# Patient Record
Sex: MALE | Race: WHITE | NOT HISPANIC OR LATINO | Employment: UNEMPLOYED | URBAN - METROPOLITAN AREA
[De-identification: names, ages, dates, MRNs, and addresses within clinical notes are randomized per-mention and may not be internally consistent; named-entity substitution may affect disease eponyms.]

---

## 2017-02-09 ENCOUNTER — PATIENT OUTREACH (OUTPATIENT)
Dept: ADMINISTRATIVE | Facility: HOSPITAL | Age: 62
End: 2017-02-09

## 2017-02-09 NOTE — PROGRESS NOTES
Due for bp check, last bp 146/88, last ov Katelin 5/2016, Schiro 8/2015    Also due your fasting cholesterol labs and shingles and flu immunizations    4th attempt

## 2017-05-04 ENCOUNTER — PATIENT OUTREACH (OUTPATIENT)
Dept: ADMINISTRATIVE | Facility: HOSPITAL | Age: 62
End: 2017-05-04

## 2017-05-04 NOTE — LETTER
May 16, 2017    Iván Chavez  [address] [address] [address]             [facility]  [address]  [facility] [address]  Phone: [phone] Dear Mr. Chavez:    We have tried to reach you by mychart unsuccessfully.    [facility] is committed to your overall health.  To help you get the most out of each of your visits, we will review your information to make sure you are up to date on all of your recommended tests and/or procedures.  We have Dr. Rosa Medina listed as your primary care provider.     If Dr. Medina is no longer your primary care provider, please contact me so that we may update our records accordingly.       She has found that you may be due for an office visit with her, your fasting cholesterol labs, and possibly a shingles immunization.     If you have had any of the above done at an outside facility, please let us know so I can update your record.  If you have a copy of these records, please provide a copy for us to scan into your chart.  If not, please provide that provider/facilities contact information so that we may obtain copies from that facility.     Otherwise, please schedule these appointments at your earliest convenience.     If you have any questions or concerns, please don't hesitate to call.    Thank you for letting us care for you,  Shima Oliva LPN Clinical Care Coordinator  [facility] [place]  [phone]

## 2017-05-04 NOTE — PROGRESS NOTES
HTN registry, over 1 year.  Called pt to inform:    Due for an office visit with her, your fasting cholesterol labs, and possibly a shingles immunization    Last of Katelin 5/2016, Adam 8/2015, 5th attempt

## 2017-06-02 RX ORDER — LISINOPRIL 10 MG/1
TABLET ORAL
Qty: 90 TABLET | Refills: 0 | Status: SHIPPED | OUTPATIENT
Start: 2017-06-02 | End: 2017-09-12 | Stop reason: SDUPTHER

## 2017-06-02 RX ORDER — CITALOPRAM 40 MG/1
TABLET, FILM COATED ORAL
Qty: 90 TABLET | Refills: 0 | Status: SHIPPED | OUTPATIENT
Start: 2017-06-02 | End: 2017-09-12 | Stop reason: SDUPTHER

## 2017-06-13 ENCOUNTER — PATIENT OUTREACH (OUTPATIENT)
Dept: ADMINISTRATIVE | Facility: HOSPITAL | Age: 62
End: 2017-06-13

## 2017-06-13 NOTE — PROGRESS NOTES
HTN registry, over 1 year.  Called pt to inform:    Due for an office visit with her, your fasting cholesterol labs, and a shingles immunization    Last ov Katelin 5/2016, Adam 8/2015, 6th attempt - Roxi's letter sent.

## 2017-06-13 NOTE — LETTER
June 21, 2017    Iván Chavez  [address] [address] [address]             [facility]  [address]  [facility] [address]  Phone: [phone] Dear Mr. Chavez:    We have tried to reach you by mychart unsuccessfully.    We have tried to reach you unsuccessfully for the past several months.  We care about your health and want to ensure you are receiving the healthcare you need.  Please contact our office so that we can schedule any necessary appointments or testing that are due.     [facility] is committed to your overall health.  To help you get the most out of each of your visits, we will review your information to make sure you are up to date on all of your recommended tests and/or procedures.       We have Dr. Rosa Medina listed as your primary care provider.  You have not been in to see her since August of 2015.  If Dr. Medina is no longer your primary care provider, please contact me so that we may update our records accordingly.       She has found that you may be due for an office visit with her, your fasting cholesterol labs, and a shingles immunization.     If you have had any of the above done at an outside facility, please let us know so I can update your record.  If you have a copy of these records, please provide a copy for us to scan into your chart.  If not, please provide that provider/facilities contact information so that we may obtain copies from that facility.     Otherwise, please schedule these appointments at your earliest convenience.     If you have any questions or concerns, please don't hesitate to call.    Thank you for letting us care for you,  Shima Oliva LPN Clinical Care Coordinator  [facility] [place]  [phone]

## 2017-09-12 ENCOUNTER — TELEPHONE (OUTPATIENT)
Dept: FAMILY MEDICINE | Facility: CLINIC | Age: 62
End: 2017-09-12

## 2017-09-12 RX ORDER — LISINOPRIL 10 MG/1
10 TABLET ORAL DAILY
Qty: 90 TABLET | Refills: 0 | Status: SHIPPED | OUTPATIENT
Start: 2017-09-12 | End: 2018-07-19 | Stop reason: SDUPTHER

## 2017-09-12 RX ORDER — CITALOPRAM 40 MG/1
TABLET, FILM COATED ORAL
Qty: 90 TABLET | Refills: 0 | Status: SHIPPED | OUTPATIENT
Start: 2017-09-12 | End: 2018-11-01 | Stop reason: SDUPTHER

## 2017-09-12 NOTE — TELEPHONE ENCOUNTER
----- Message from Daysi Benz sent at 9/12/2017  9:53 AM CDT -----  Contact: self [phone]  He is requesting renewal of citalopram and lisinopril be sent to a new pharmacy ([facility] [address] @ [phone])  Thank you!

## 2017-09-13 NOTE — TELEPHONE ENCOUNTER
----- Message from Chauncey Santana sent at 9/13/2017  3:03 PM CDT -----  Contact: same  Patient called in and stated he was returning a call from earlier today and would like a call back at [phone]

## 2017-09-13 NOTE — TELEPHONE ENCOUNTER
Pt states that he has changed physicians due to his insurance coverage. Pt will notify the pharmacy to send future requests to new physician.--lp

## 2018-07-19 ENCOUNTER — OFFICE VISIT (OUTPATIENT)
Dept: FAMILY MEDICINE | Facility: CLINIC | Age: 63
End: 2018-07-19

## 2018-07-19 VITALS
TEMPERATURE: 98 F | HEART RATE: 72 BPM | BODY MASS INDEX: 22.22 KG/M2 | WEIGHT: 146.63 LBS | RESPIRATION RATE: 18 BRPM | SYSTOLIC BLOOD PRESSURE: 136 MMHG | HEIGHT: 68 IN | DIASTOLIC BLOOD PRESSURE: 92 MMHG

## 2018-07-19 DIAGNOSIS — F32.A DEPRESSION, UNSPECIFIED DEPRESSION TYPE: ICD-10-CM

## 2018-07-19 DIAGNOSIS — F41.9 ANXIETY: ICD-10-CM

## 2018-07-19 DIAGNOSIS — I10 BENIGN ESSENTIAL HTN: Primary | ICD-10-CM

## 2018-07-19 PROCEDURE — 99213 OFFICE O/P EST LOW 20 MIN: CPT | Mod: S$GLB,,, | Performed by: NURSE PRACTITIONER

## 2018-07-19 RX ORDER — LISINOPRIL 10 MG/1
10 TABLET ORAL DAILY
Qty: 90 TABLET | Refills: 3 | Status: SHIPPED | OUTPATIENT
Start: 2018-07-19 | End: 2019-08-09 | Stop reason: SDUPTHER

## 2018-07-24 NOTE — PROGRESS NOTES
"Subjective:       Patient ID: Iván Chavez is a 62 y.o. male.    Chief Complaint: Medication Refill    HPI here for follow up on HTN, depression and anxiety. States he is doing well on his current medications but has been out for about a week now. He does not have insurance at this time. He is due for routine labs. States his BP has been well controlled. He denies any specific concerns today. See ROS.    The following portion of the patients history was reviewed and updated as appropriate: allergies, current medications, past medical and surgical history. Past social history and problem list reviewed. Family PMH and Past social history reviewed. Tobacco, Illicit drug use reviewed.     Review of Systems  Constitutional: No fatigue or fever    HENT: no sore throat or nasal congestion. No voice changes    Eyes: No vision changes, blurred vision  Skin: no rashes or lesions  Respiratory:   No SOB, Wheezing, cough  Cardiovascular:   No CP, Palpitations  Gastrointestinal:   No N/V/D. No abdominal pain, weight stable. Appetite good.   Genitourinary:   No dysuria, urgency or frequency. No change in bowels. No blood in stools.   Musculoskeletal:   No joint pain  No change in gait or coordination. .  Neurological:   No dizziness. No headaches  Hematological: No abnormal bruising or bleeding    Psychiatric/Behavioral Negative for suicidal ideas.  Denies feelings of depression. No thoughts of wanting to harm self or others.     Objective:     BP (!) 136/92 (BP Location: Left arm, Patient Position: Sitting, BP Method: Medium (Manual))   Pulse 72   Temp 98.4 °F (36.9 °C) (Oral)   Resp 18   Ht 5' 8" (1.727 m)   Wt 66.5 kg (146 lb 9.6 oz)   BMI 22.29 kg/m²      Physical Exam     Constitutional: oriented to person, place, and time. well-developed and well-nourished.   Head: Normocephalic.   Eyes: Conjunctivae are normal. Pupils are equal, round, and reactive to light.   Neck: Normal range of motion. Neck supple. No tracheal " deviation present. No thyromegaly present.   Cardiovascular: Normal rate, regular rhythm and normal heart sounds.    Pulmonary/Chest: Effort normal and breath sounds normal. No respiratory distress. No wheezes.   Abdominal: Soft. Bowel sounds are normal. No distension. There is no tenderness.   Musculoskeletal: Normal range of motion. Gait and coordination normal.   Neurological: oriented to person, place, and time.   Skin: Skin is warm and dry. No rashes or lesions  Psychiatric: Normal mood and affect.Behavior is normal. Judgment and thought content normal.   Assessment:       1. Benign essential HTN    2. Depression, unspecified depression type    3. Anxiety        Plan:         Iván was seen today for medication refill.    Diagnoses and all orders for this visit:    Benign essential HTN: good control. Continue current medications.     Depression, unspecified depression type: continue celexa. Well controlled.     Anxiety  He is due for labs but does not have insurance at this time. He will follow up to have those done when he gets insurance.    Other orders  -     lisinopril 10 MG tablet; Take 1 tablet (10 mg total) by mouth once daily.    Continue current medication  Take medications only as prescribed  Healthy diet, exercise  Adequate rest  Adequate hydration  Avoid allergens  Avoid excessive caffeine

## 2018-11-01 RX ORDER — CITALOPRAM 40 MG/1
TABLET, FILM COATED ORAL
Qty: 90 TABLET | Refills: 0 | Status: SHIPPED | OUTPATIENT
Start: 2018-11-01 | End: 2019-02-04 | Stop reason: SDUPTHER

## 2018-11-01 NOTE — TELEPHONE ENCOUNTER
----- Message from Judy Pradhan sent at 11/1/2018  1:05 PM CDT -----  Contact: self [phone]  1. What is the name of the medication you are requesting? citalopram  2. What is the dose? 40mg  3. How do you take the medication? Orally, topically, etc? orally  4. How often do you take this medication? daily  5. Do you need a 30 day or 90 day supply? 90 day  6. How many refills are you requesting? 3  7. What is your preferred pharmacy and location of the pharmacy?     [facility] #[id number] - [place], [place] - [address]  [address] [address]  Phone: [phone] Fax: [phone]    8. Who can we contact with further questions? Pt [phone]

## 2019-02-04 RX ORDER — CITALOPRAM 40 MG/1
TABLET, FILM COATED ORAL
Qty: 90 TABLET | Refills: 0 | Status: SHIPPED | OUTPATIENT
Start: 2019-02-04 | End: 2019-05-08 | Stop reason: SDUPTHER

## 2019-05-04 ENCOUNTER — NURSE TRIAGE (OUTPATIENT)
Dept: ADMINISTRATIVE | Facility: CLINIC | Age: 64
End: 2019-05-04

## 2019-05-04 ENCOUNTER — OFFICE VISIT (OUTPATIENT)
Dept: URGENT CARE | Facility: CLINIC | Age: 64
End: 2019-05-04

## 2019-05-04 VITALS
BODY MASS INDEX: 22.13 KG/M2 | WEIGHT: 146 LBS | TEMPERATURE: 98 F | HEIGHT: 68 IN | OXYGEN SATURATION: 100 % | HEART RATE: 77 BPM | SYSTOLIC BLOOD PRESSURE: 132 MMHG | DIASTOLIC BLOOD PRESSURE: 85 MMHG

## 2019-05-04 DIAGNOSIS — T14.8XXA BRUISING: Primary | ICD-10-CM

## 2019-05-04 PROCEDURE — 99213 OFFICE O/P EST LOW 20 MIN: CPT | Mod: S$GLB,,, | Performed by: PHYSICIAN ASSISTANT

## 2019-05-04 PROCEDURE — 99213 PR OFFICE/OUTPT VISIT, EST, LEVL III, 20-29 MIN: ICD-10-PCS | Mod: S$GLB,,, | Performed by: PHYSICIAN ASSISTANT

## 2019-05-04 NOTE — TELEPHONE ENCOUNTER
A little swelling and broken blood vessel underneath his eye.  Looks like ruptured blood vessell, no changes in vision, no pain, not trauma related.  Lost call, unable to reach patient again due to my phone connection issues, had Cielo place patient back on the board.  Able to call patient back once I got to the office.  Pt reports that on Thursday morning, felt a little something, like a foreign body ( he describes eye lash in his eye) on his eyelid, brushed it away, doesn't remember if he actually felt anything there.  about an hour later, he looked in the mirror and saw it was slightly swollen and red/purple.  Now the upper lid is no longer swollen, and the red/purple appearance is almost gone, but there is a  Larger bruise underneath the eye, (states it looks like he has a black eye) but no swelling, no pain, no change in vision.     Reason for Disposition   Unable to complete triage due to phone connection issues    Protocols used: NO CONTACT OR DUPLICATE CONTACT CALL-A-

## 2019-05-04 NOTE — PATIENT INSTRUCTIONS

## 2019-05-04 NOTE — PROGRESS NOTES
"Subjective:       Patient ID: Iván Chavez is a 63 y.o. male.    Vitals:  height is 5' 8" (1.727 m) and weight is 66.2 kg (146 lb). His oral temperature is 98.2 °F (36.8 °C). His blood pressure is 132/85 and his pulse is 77. His oxygen saturation is 100%.     Chief Complaint: Insect Bite    Iván noticed a bite on his left eye on Thursday. Since then he states it has started to bruise. He denies any vision problems.     Insect Bite   This is a new problem. The current episode started in the past 7 days. The problem occurs constantly. The problem has been rapidly worsening. Pertinent negatives include no abdominal pain, chills, congestion, fatigue, fever, headaches, joint swelling, nausea, rash, vertigo or vomiting. Nothing aggravates the symptoms. He has tried nothing for the symptoms.       Constitution: Negative for chills, fatigue and fever.   HENT: Negative for facial swelling, facial trauma, congestion and sinus pain.    Neck: Negative for neck stiffness.   Cardiovascular: Negative for chest trauma.   Eyes: Negative for eye trauma, foreign body in eye, eye discharge, eye itching, eye pain, eye redness, photophobia, vision loss, double vision, blurred vision and eyelid swelling.   Gastrointestinal: Negative for abdominal trauma, abdominal pain, nausea, vomiting and rectal bleeding.   Genitourinary: Negative for hematuria, genital trauma and pelvic pain.   Musculoskeletal: Negative for pain, trauma, joint swelling, abnormal ROM of joint and pain with walking.   Skin: Negative for color change, rash, wound, abrasion and laceration.   Allergic/Immunologic: Negative for seasonal allergies and itching.   Neurological: Negative for dizziness, history of vertigo, light-headedness, coordination disturbances, headaches, altered mental status and loss of consciousness.   Hematologic/Lymphatic: Negative for history of bleeding disorder.   Psychiatric/Behavioral: Negative for altered mental status.       Objective:    "   Physical Exam   Constitutional: He is oriented to person, place, and time. He appears well-developed and well-nourished.   HENT:   Head: Normocephalic and atraumatic.   Right Ear: External ear normal.   Left Ear: External ear normal.   Nose: Nose normal.   Mouth/Throat: Oropharynx is clear and moist.   Eyes: Pupils are equal, round, and reactive to light. Conjunctivae and EOM are normal.   Left eye ecchymoses upper lip and lower lip.  No swelling. No edema or induration.  No discharge.   Neck: Trachea normal, full passive range of motion without pain and phonation normal. Neck supple.   Musculoskeletal: Normal range of motion.   Neurological: He is alert and oriented to person, place, and time.   Skin: Skin is warm, dry and intact.   Psychiatric: He has a normal mood and affect. His speech is normal and behavior is normal. Judgment and thought content normal. Cognition and memory are normal.   Nursing note and vitals reviewed.      Assessment:       1. Bruising        Plan:         Bruising     Patient states he felt like an insect was biting his left upper eyelid the other night and went to slap it off.  He did not actually seen insect but noticed bruising and swelling shortly after.  He is not on any blood thinners.  He denies any changes in vision.  He discussed with a friend of his to was worried about angioedema.  At this point bruising appears to be healing well.  I have suggested if this is a concern to follow up with primary care physician for further evaluation testing.  If he does develop any changes in vision he should return to clinic or go to ED immediately.    You must understand that you've received an Urgent Care treatment only and that you may be released before all your medical problems are known or treated. You, the patient, will arrange for follow up care as instructed.  Follow up with your PCP or specialty clinic as directed in the next 1-2 weeks if not improved or as needed.  You can call  [phone] to schedule an appointment with the appropriate provider.  If your condition worsens we recommend that you receive another evaluation at the emergency room immediately or contact your primary medical clinics after hours call service to discuss your concerns.  Please return here or go to the Emergency Department for any concerns or worsening of condition.

## 2019-05-06 ENCOUNTER — TELEPHONE (OUTPATIENT)
Dept: FAMILY MEDICINE | Facility: CLINIC | Age: 64
End: 2019-05-06

## 2019-05-06 NOTE — TELEPHONE ENCOUNTER
----- Message from Nika Henry sent at 5/6/2019 10:42 AM CDT -----     Making a  Correction  On an  Earlier  Message  Sent to  April  (Today,  9:48 AM)  //  April  Was  Not in  The   Room with  Mr Chavez , but another pt and  Unable to  Come  To  Phone   ty

## 2019-05-06 NOTE — TELEPHONE ENCOUNTER
Just keep trying to get in touch with him about this. Does not sound worrisome if he is not having any vision changes but need to check that it is resolving.

## 2019-05-06 NOTE — TELEPHONE ENCOUNTER
Called pt and left him a message to please return my call at [phone] to let us know how his eye is doing.

## 2019-05-06 NOTE — TELEPHONE ENCOUNTER
----- Message from Nika Henry sent at 5/6/2019  9:48 AM CDT -----   Type:  Patient Returning Call    Who Called: pt  Who Left Message for Patient:  april  Does the patient know what this is regarding?:   Concern  Urgent care  And  Eye  Has  improved  Best Call Back Number: [phone]  Additional Information: spoke to pod // April  In  Room  With  pt

## 2019-05-07 ENCOUNTER — TELEPHONE (OUTPATIENT)
Dept: URGENT CARE | Facility: CLINIC | Age: 64
End: 2019-05-07

## 2019-05-07 NOTE — TELEPHONE ENCOUNTER
Unable to reach patient, per 5/6/19 phone note, patients eye has improved after visiting urgent care.

## 2019-05-08 RX ORDER — CITALOPRAM 40 MG/1
TABLET, FILM COATED ORAL
Qty: 90 TABLET | Refills: 0 | Status: SHIPPED | OUTPATIENT
Start: 2019-05-08 | End: 2019-08-05 | Stop reason: SDUPTHER

## 2019-08-05 RX ORDER — CITALOPRAM 40 MG/1
TABLET, FILM COATED ORAL
Qty: 30 TABLET | Refills: 0 | Status: SHIPPED | OUTPATIENT
Start: 2019-08-05 | End: 2019-12-19 | Stop reason: SDUPTHER

## 2019-08-05 NOTE — TELEPHONE ENCOUNTER
He has not seen me in over a year. He is due for office visit and labs. I cannot keep giving him refills without seeing him. I will give one refill to hold him until he can be seen. No more after that until office visit. I know it shows he does not have insurance but I still have to see him at least once a year.

## 2019-08-09 DIAGNOSIS — Z12.5 PROSTATE CANCER SCREENING: ICD-10-CM

## 2019-08-09 DIAGNOSIS — I10 ESSENTIAL HYPERTENSION: Primary | ICD-10-CM

## 2019-08-09 RX ORDER — LISINOPRIL 10 MG/1
TABLET ORAL
Qty: 30 TABLET | Refills: 0 | Status: SHIPPED | OUTPATIENT
Start: 2019-08-09 | End: 2019-10-06 | Stop reason: SDUPTHER

## 2019-08-09 NOTE — TELEPHONE ENCOUNTER
He has not see me in over a year. He is due for office visit and labs. He has to be seen for me to keep giving him medications. Orders placed for labs. Please ask him to schedule. One refill to hold him until he can be seen. If he has no insurance he can contact [facility] about their financial assistance.

## 2019-09-10 RX ORDER — LISINOPRIL 10 MG/1
TABLET ORAL
Qty: 30 TABLET | Refills: 0 | OUTPATIENT
Start: 2019-09-10

## 2019-09-10 NOTE — TELEPHONE ENCOUNTER
He has not been seen in over a year. I gave him a refill to hold him until he could be seen on 8/5/19. I cannot give him anymore unitl he is seen. I have to see him yearly to be able to give refills

## 2019-09-17 ENCOUNTER — TELEPHONE (OUTPATIENT)
Dept: FAMILY MEDICINE | Facility: CLINIC | Age: 64
End: 2019-09-17

## 2019-09-17 NOTE — TELEPHONE ENCOUNTER
Patient has not been seen within the last 12 months, attempting to schedule appointment with PCP. No answer, left message with callback number

## 2019-10-07 RX ORDER — LISINOPRIL 10 MG/1
TABLET ORAL
Qty: 30 TABLET | Refills: 0 | Status: SHIPPED | OUTPATIENT
Start: 2019-10-07 | End: 2019-12-19 | Stop reason: SDUPTHER

## 2019-10-31 RX ORDER — LISINOPRIL 10 MG/1
TABLET ORAL
Qty: 30 TABLET | Refills: 0 | OUTPATIENT
Start: 2019-10-31

## 2019-11-25 ENCOUNTER — OFFICE VISIT (OUTPATIENT)
Dept: URGENT CARE | Facility: CLINIC | Age: 64
End: 2019-11-25

## 2019-11-25 VITALS
SYSTOLIC BLOOD PRESSURE: 156 MMHG | OXYGEN SATURATION: 98 % | DIASTOLIC BLOOD PRESSURE: 98 MMHG | RESPIRATION RATE: 18 BRPM | TEMPERATURE: 99 F | WEIGHT: 146 LBS | BODY MASS INDEX: 22.13 KG/M2 | HEART RATE: 74 BPM | HEIGHT: 68 IN

## 2019-11-25 DIAGNOSIS — J32.9 CLINICAL SINUSITIS: Primary | ICD-10-CM

## 2019-11-25 PROCEDURE — 99203 PR OFFICE/OUTPT VISIT, NEW, LEVL III, 30-44 MIN: ICD-10-PCS | Mod: S$GLB,,, | Performed by: FAMILY MEDICINE

## 2019-11-25 PROCEDURE — 99203 OFFICE O/P NEW LOW 30 MIN: CPT | Mod: S$GLB,,, | Performed by: FAMILY MEDICINE

## 2019-11-25 RX ORDER — AMOXICILLIN 875 MG/1
875 TABLET, FILM COATED ORAL 2 TIMES DAILY
Qty: 20 TABLET | Refills: 0 | Status: SHIPPED | OUTPATIENT
Start: 2019-11-25 | End: 2019-12-05

## 2019-11-25 RX ORDER — PREDNISONE 20 MG/1
TABLET ORAL
Qty: 10 TABLET | Refills: 1 | Status: SHIPPED | OUTPATIENT
Start: 2019-11-25 | End: 2019-12-19 | Stop reason: ALTCHOICE

## 2019-11-25 NOTE — PROGRESS NOTES
"Subjective:       Patient ID: Iván Chavez is a 64 y.o. male.    Vitals:  height is 5' 8" (1.727 m) and weight is 66.2 kg (146 lb). His oral temperature is 98.6 °F (37 °C). His blood pressure is 156/98 (abnormal) and his pulse is 74. His respiration is 18 and oxygen saturation is 98%.     Chief Complaint: Sinus Problem and Nasal Congestion    Pt presents today with nasal congestion, sinus pain/pressure X's 1 month. pt has taken OTC nasal spray with no relief    Sinus Problem   This is a new problem. The current episode started 1 to 4 weeks ago. The problem is unchanged. There has been no fever. The fever has been present for less than 1 day. Associated symptoms include congestion and sinus pressure. Pertinent negatives include no chills, coughing, diaphoresis, ear pain, headaches, hoarse voice, neck pain, shortness of breath, sneezing, sore throat or swollen glands. Treatments tried: OTC nasal spray. The treatment provided no relief.       Constitution: Negative for chills, sweating, fatigue and fever.   HENT: Positive for congestion, postnasal drip, sinus pain and sinus pressure. Negative for ear pain, sore throat and voice change.    Neck: Negative for neck pain and painful lymph nodes.   Eyes: Negative for eye redness.   Respiratory: Negative for chest tightness, cough, sputum production, bloody sputum, COPD, shortness of breath, stridor, wheezing and asthma.    Gastrointestinal: Negative for nausea and vomiting.   Musculoskeletal: Negative for muscle ache.   Skin: Negative for rash.   Allergic/Immunologic: Negative for seasonal allergies, asthma and sneezing.   Neurological: Negative for headaches.   Hematologic/Lymphatic: Negative for swollen lymph nodes.       Objective:      Physical Exam   Constitutional: He is oriented to person, place, and time. He appears well-developed and well-nourished. He is cooperative.  Non-toxic appearance. He does not have a sickly appearance. He does not appear ill. No " distress.   HENT:   Head: Normocephalic and atraumatic.   Right Ear: Hearing, tympanic membrane, external ear and ear canal normal.   Left Ear: Hearing, tympanic membrane, external ear and ear canal normal.   Nose: Mucosal edema present. No rhinorrhea or nasal deformity. No epistaxis. Right sinus exhibits maxillary sinus tenderness. Right sinus exhibits no frontal sinus tenderness. Left sinus exhibits maxillary sinus tenderness. Left sinus exhibits no frontal sinus tenderness.   Mouth/Throat: Uvula is midline, oropharynx is clear and moist and mucous membranes are normal. No trismus in the jaw. Normal dentition. No uvula swelling. No oropharyngeal exudate, posterior oropharyngeal edema or posterior oropharyngeal erythema.   Eyes: Conjunctivae and lids are normal. No scleral icterus.   Neck: Trachea normal, full passive range of motion without pain and phonation normal. Neck supple. No neck rigidity. No edema and no erythema present.   Cardiovascular: Normal rate, regular rhythm, normal heart sounds, intact distal pulses and normal pulses.   Pulmonary/Chest: Effort normal and breath sounds normal. No respiratory distress. He has no decreased breath sounds. He has no rhonchi.   Abdominal: Normal appearance.   Musculoskeletal: Normal range of motion. He exhibits no edema or deformity.   Neurological: He is alert and oriented to person, place, and time. He exhibits normal muscle tone. Coordination normal.   Skin: Skin is warm, dry, intact, not diaphoretic and not pale.   Psychiatric: He has a normal mood and affect. His speech is normal and behavior is normal. Judgment and thought content normal. Cognition and memory are normal.   Nursing note and vitals reviewed.        Assessment:       1. Clinical sinusitis        Plan:         Clinical sinusitis    Other orders  -     amoxicillin (AMOXIL) 875 MG tablet; Take 1 tablet (875 mg total) by mouth 2 (two) times daily. for 10 days  Dispense: 20 tablet; Refill: 0  -      predniSONE (DELTASONE) 20 MG tablet; Take 40mg x2 days, 30 mg x2 days, 20mg x2 days, 10mg x2 days  Dispense: 10 tablet; Refill: 1

## 2019-12-19 ENCOUNTER — OFFICE VISIT (OUTPATIENT)
Dept: FAMILY MEDICINE | Facility: CLINIC | Age: 64
End: 2019-12-19

## 2019-12-19 VITALS
OXYGEN SATURATION: 97 % | RESPIRATION RATE: 18 BRPM | WEIGHT: 151.44 LBS | HEART RATE: 100 BPM | BODY MASS INDEX: 22.95 KG/M2 | TEMPERATURE: 98 F | SYSTOLIC BLOOD PRESSURE: 128 MMHG | HEIGHT: 68 IN | DIASTOLIC BLOOD PRESSURE: 70 MMHG

## 2019-12-19 DIAGNOSIS — F32.A DEPRESSION, UNSPECIFIED DEPRESSION TYPE: ICD-10-CM

## 2019-12-19 DIAGNOSIS — J20.9 BRONCHITIS WITH BRONCHOSPASM: ICD-10-CM

## 2019-12-19 DIAGNOSIS — I10 ESSENTIAL HYPERTENSION: ICD-10-CM

## 2019-12-19 DIAGNOSIS — F41.9 ANXIETY: ICD-10-CM

## 2019-12-19 DIAGNOSIS — J30.9 ALLERGIC RHINITIS, UNSPECIFIED SEASONALITY, UNSPECIFIED TRIGGER: Primary | ICD-10-CM

## 2019-12-19 PROCEDURE — 96372 PR INJECTION,THERAP/PROPH/DIAG2ST, IM OR SUBCUT: ICD-10-PCS | Mod: S$GLB,,, | Performed by: NURSE PRACTITIONER

## 2019-12-19 PROCEDURE — 99214 OFFICE O/P EST MOD 30 MIN: CPT | Mod: 25,S$GLB,, | Performed by: NURSE PRACTITIONER

## 2019-12-19 PROCEDURE — 96372 THER/PROPH/DIAG INJ SC/IM: CPT | Mod: S$GLB,,, | Performed by: NURSE PRACTITIONER

## 2019-12-19 PROCEDURE — 99214 PR OFFICE/OUTPT VISIT, EST, LEVL IV, 30-39 MIN: ICD-10-PCS | Mod: 25,S$GLB,, | Performed by: NURSE PRACTITIONER

## 2019-12-19 RX ORDER — LISINOPRIL 10 MG/1
10 TABLET ORAL DAILY
Qty: 90 TABLET | Refills: 3 | Status: SHIPPED | OUTPATIENT
Start: 2019-12-19

## 2019-12-19 RX ORDER — CITALOPRAM 40 MG/1
40 TABLET, FILM COATED ORAL DAILY
Qty: 90 TABLET | Refills: 3 | Status: SHIPPED | OUTPATIENT
Start: 2019-12-19

## 2019-12-19 RX ORDER — DEXAMETHASONE SODIUM PHOSPHATE 4 MG/ML
8 INJECTION, SOLUTION INTRA-ARTICULAR; INTRALESIONAL; INTRAMUSCULAR; INTRAVENOUS; SOFT TISSUE ONCE
Status: COMPLETED | OUTPATIENT
Start: 2019-12-19 | End: 2019-12-19

## 2019-12-19 RX ADMIN — DEXAMETHASONE SODIUM PHOSPHATE 8 MG: 4 INJECTION, SOLUTION INTRA-ARTICULAR; INTRALESIONAL; INTRAMUSCULAR; INTRAVENOUS; SOFT TISSUE at 10:12

## 2019-12-19 NOTE — PROGRESS NOTES
Subjective:       Patient ID: Iván Chavez is a 64 y.o. male.    Chief Complaint: Sinusitis (Went to Urgent Care about 3 weeks ago, not better now has chest congestion as well); Chest Congestion (Flu shot today); Cough; and Sore Throat (In mornings)    HPI here with continuation of sinus issues. Was seen at Urgent care on 11/25.  He was treated with amoxil and prednisone taper. States symptoms got better but now are back. He has not been taking any allergy sinus medications.    He is taking his BP medication and anxiety medication as prescribed.  He is due for refills. See ROS    The following portion of the patients history was reviewed and updated as appropriate: allergies, current medications, past medical and surgical history. Past social history and problem list reviewed. Family PMH and Past social history reviewed. Tobacco, Illicit drug use reviewed.      Review of patient's allergies indicates:   Allergen Reactions    No known drug allergies          Current Outpatient Medications:     citalopram (CELEXA) 40 MG tablet, TAKE 1 TABLET BY MOUTH EVERY DAY, Disp: 30 tablet, Rfl: 0    lisinopril 10 MG tablet, TAKE 1 TABLET BY MOUTH EVERY DAY, Disp: 30 tablet, Rfl: 0    Past Medical History:   Diagnosis Date    Anxiety     Depression     History of hypertension     MVP (mitral valve prolapse)     S/P colonoscopy     12/12; 12/22       Past Surgical History:   Procedure Laterality Date    CHOLECYSTECTOMY         Social History     Socioeconomic History    Marital status:      Spouse name: Not on file    Number of children: Not on file    Years of education: Not on file    Highest education level: Not on file   Occupational History    Not on file   Social Needs    Financial resource strain: Not on file    Food insecurity:     Worry: Not on file     Inability: Not on file    Transportation needs:     Medical: Not on file     Non-medical: Not on file   Tobacco Use    Smoking status: Never  "Smoker    Smokeless tobacco: Never Used   Substance and Sexual Activity    Alcohol use: No     Alcohol/week: 0.0 standard drinks    Drug use: No    Sexual activity: Yes     Partners: Female   Lifestyle    Physical activity:     Days per week: Not on file     Minutes per session: Not on file    Stress: Not on file   Relationships    Social connections:     Talks on phone: Not on file     Gets together: Not on file     Attends Restoration service: Not on file     Active member of club or organization: Not on file     Attends meetings of clubs or organizations: Not on file     Relationship status: Not on file   Other Topics Concern    Not on file   Social History Narrative    Not on file     Review of Systems   Constitutional: Positive for fatigue. Negative for fever.   HENT: Positive for congestion, postnasal drip, rhinorrhea and sore throat (in mornings is sore). Negative for ear pain, sinus pressure and sinus pain.    Respiratory: Positive for cough (mild). Negative for chest tightness, shortness of breath and wheezing.    Cardiovascular: Negative for chest pain, palpitations and leg swelling.   Gastrointestinal: Negative for abdominal pain, diarrhea, nausea and vomiting.   Musculoskeletal: Negative for arthralgias, back pain and gait problem.   Neurological: Negative for weakness and headaches.   Psychiatric/Behavioral: Negative for dysphoric mood and sleep disturbance. The patient is not nervous/anxious.        Objective:      /70   Pulse 100   Temp 98.3 °F (36.8 °C) (Oral)   Resp 18   Ht 5' 8" (1.727 m)   Wt 68.7 kg (151 lb 7.3 oz)   SpO2 97%   BMI 23.03 kg/m²      Physical Exam   Constitutional: He is oriented to person, place, and time. He appears well-developed and well-nourished. No distress.   HENT:   Head: Normocephalic and atraumatic.   Right Ear: Tympanic membrane, external ear and ear canal normal.   Left Ear: Tympanic membrane, external ear and ear canal normal.   Nose: Rhinorrhea " present. Right sinus exhibits no maxillary sinus tenderness and no frontal sinus tenderness. Left sinus exhibits no maxillary sinus tenderness and no frontal sinus tenderness.   Mouth/Throat: Uvula is midline and mucous membranes are normal. No oropharyngeal exudate, posterior oropharyngeal edema or posterior oropharyngeal erythema.   Eyes: Pupils are equal, round, and reactive to light. Conjunctivae are normal. Right eye exhibits no discharge. Left eye exhibits no discharge.   Neck: Normal range of motion. Neck supple. No JVD present. No thyromegaly present.   Cardiovascular: Normal rate, regular rhythm and normal heart sounds. Exam reveals no gallop.   No murmur heard.  Pulses:       Carotid pulses are 2+ on the right side, and 2+ on the left side.       Radial pulses are 2+ on the right side, and 2+ on the left side.   Pulmonary/Chest: Effort normal. No respiratory distress. He has wheezes. He has no rales. He exhibits no tenderness.   Abdominal: Soft. Bowel sounds are normal. He exhibits no distension. There is no tenderness. There is no rebound and no guarding.   Musculoskeletal: Normal range of motion. He exhibits no edema.   Gait and coordination normal.  strong, equal. Upper and lower extremity strength normal.    Lymphadenopathy:     He has no cervical adenopathy.   Neurological: He is alert and oriented to person, place, and time.   Skin: Skin is warm and dry. Capillary refill takes less than 2 seconds. No rash noted. He is not diaphoretic.   Psychiatric: He has a normal mood and affect. His speech is normal and behavior is normal. Judgment and thought content normal.   Nursing note and vitals reviewed.      Assessment:       1. Allergic rhinitis, unspecified seasonality, unspecified trigger    2. Bronchitis with bronchospasm    3. Essential hypertension    4. Anxiety    5. Depression, unspecified depression type        Plan:       Allergic rhinitis, unspecified seasonality, unspecified trigger  -      dexamethasone injection 8 mg:  Risks and benefits discussed. Potential side effects such as anxiety, palpitations and flushing discussed. May increase blood glucose levels over the next 48 hours. Potential for skin atrophy at injection site. Tolerated injection well.    Bronchitis with bronchospasm:will give decadron injection for bronchial inflammation.     Essential hypertension: good BP control. Continue current medications.     Anxiety: good control with the celexa. Continue current dose.    Depression, unspecified depression type: good control with current medication    Other orders  -     lisinopril 10 MG tablet; Take 1 tablet (10 mg total) by mouth once daily.  Dispense: 90 tablet; Refill: 3  -     citalopram (CELEXA) 40 MG tablet; Take 1 tablet (40 mg total) by mouth once daily.  Dispense: 90 tablet; Refill: 3       Continue current medication  Take medications only as prescribed  Healthy diet, exercise  Adequate rest  Adequate hydration  Avoid allergens  Avoid excessive caffeine     follow up one week if not improving

## 2020-05-05 ENCOUNTER — PATIENT MESSAGE (OUTPATIENT)
Dept: ADMINISTRATIVE | Facility: HOSPITAL | Age: 65
End: 2020-05-05

## 2020-07-01 ENCOUNTER — TELEPHONE (OUTPATIENT)
Dept: FAMILY MEDICINE | Facility: CLINIC | Age: 65
End: 2020-07-01

## 2020-07-01 NOTE — TELEPHONE ENCOUNTER
Called pt & left him a voicemail to please return my call at [phone] to see what his symptoms are to see if it is ok for him to come into clinic tomorrow since we are scrubbing schedules for covid like symptoms.

## 2020-07-02 ENCOUNTER — TELEPHONE (OUTPATIENT)
Dept: FAMILY MEDICINE | Facility: CLINIC | Age: 65
End: 2020-07-02

## 2020-07-02 ENCOUNTER — OFFICE VISIT (OUTPATIENT)
Dept: FAMILY MEDICINE | Facility: CLINIC | Age: 65
End: 2020-07-02

## 2020-07-02 VITALS
DIASTOLIC BLOOD PRESSURE: 92 MMHG | WEIGHT: 139.56 LBS | HEIGHT: 67 IN | TEMPERATURE: 97 F | HEART RATE: 76 BPM | OXYGEN SATURATION: 98 % | RESPIRATION RATE: 16 BRPM | BODY MASS INDEX: 21.9 KG/M2 | SYSTOLIC BLOOD PRESSURE: 140 MMHG

## 2020-07-02 DIAGNOSIS — D22.9 CHANGE IN MOLE: ICD-10-CM

## 2020-07-02 DIAGNOSIS — R06.02 SOB (SHORTNESS OF BREATH) ON EXERTION: Primary | ICD-10-CM

## 2020-07-02 DIAGNOSIS — R53.83 FATIGUE, UNSPECIFIED TYPE: ICD-10-CM

## 2020-07-02 DIAGNOSIS — J30.9 ALLERGIC RHINITIS, UNSPECIFIED SEASONALITY, UNSPECIFIED TRIGGER: ICD-10-CM

## 2020-07-02 LAB
ALBUMIN SERPL BCP-MCNC: 4 G/DL (ref 3.5–5.2)
ALP SERPL-CCNC: 43 U/L (ref 55–135)
ALT SERPL W/O P-5'-P-CCNC: 52 U/L (ref 10–44)
ANION GAP SERPL CALC-SCNC: 10 MMOL/L (ref 8–16)
AST SERPL-CCNC: 73 U/L (ref 10–40)
BASOPHILS # BLD AUTO: 0.04 K/UL (ref 0–0.2)
BASOPHILS NFR BLD: 1.1 % (ref 0–1.9)
BILIRUB SERPL-MCNC: 0.5 MG/DL (ref 0.1–1)
BUN SERPL-MCNC: 16 MG/DL (ref 8–23)
CALCIUM SERPL-MCNC: 9.9 MG/DL (ref 8.7–10.5)
CHLORIDE SERPL-SCNC: 104 MMOL/L (ref 95–110)
CO2 SERPL-SCNC: 25 MMOL/L (ref 23–29)
CREAT SERPL-MCNC: 0.9 MG/DL (ref 0.5–1.4)
DIFFERENTIAL METHOD: ABNORMAL
EOSINOPHIL # BLD AUTO: 0.1 K/UL (ref 0–0.5)
EOSINOPHIL NFR BLD: 2.2 % (ref 0–8)
ERYTHROCYTE [DISTWIDTH] IN BLOOD BY AUTOMATED COUNT: 12.7 % (ref 11.5–14.5)
EST. GFR  (AFRICAN AMERICAN): >60 ML/MIN/1.73 M^2
EST. GFR  (NON AFRICAN AMERICAN): >60 ML/MIN/1.73 M^2
FERRITIN SERPL-MCNC: 387 NG/ML (ref 20–300)
GLUCOSE SERPL-MCNC: 93 MG/DL (ref 70–110)
HCT VFR BLD AUTO: 45.7 % (ref 40–54)
HGB BLD-MCNC: 15.1 G/DL (ref 14–18)
IMM GRANULOCYTES # BLD AUTO: 0.02 K/UL (ref 0–0.04)
IMM GRANULOCYTES NFR BLD AUTO: 0.5 % (ref 0–0.5)
IRON SERPL-MCNC: 106 UG/DL (ref 45–160)
LYMPHOCYTES # BLD AUTO: 1.1 K/UL (ref 1–4.8)
LYMPHOCYTES NFR BLD: 29.4 % (ref 18–48)
MCH RBC QN AUTO: 32.4 PG (ref 27–31)
MCHC RBC AUTO-ENTMCNC: 33 G/DL (ref 32–36)
MCV RBC AUTO: 98 FL (ref 82–98)
MONOCYTES # BLD AUTO: 0.6 K/UL (ref 0.3–1)
MONOCYTES NFR BLD: 15.7 % (ref 4–15)
NEUTROPHILS # BLD AUTO: 1.9 K/UL (ref 1.8–7.7)
NEUTROPHILS NFR BLD: 51.1 % (ref 38–73)
NRBC BLD-RTO: 0 /100 WBC
PLATELET # BLD AUTO: 238 K/UL (ref 150–350)
PMV BLD AUTO: 10.5 FL (ref 9.2–12.9)
POTASSIUM SERPL-SCNC: 4.2 MMOL/L (ref 3.5–5.1)
PROT SERPL-MCNC: 7.6 G/DL (ref 6–8.4)
RBC # BLD AUTO: 4.66 M/UL (ref 4.6–6.2)
SATURATED IRON: 29 % (ref 20–50)
SODIUM SERPL-SCNC: 139 MMOL/L (ref 136–145)
TOTAL IRON BINDING CAPACITY: 363 UG/DL (ref 250–450)
TRANSFERRIN SERPL-MCNC: 245 MG/DL (ref 200–375)
TSH SERPL DL<=0.005 MIU/L-ACNC: 0.69 UIU/ML (ref 0.4–4)
WBC # BLD AUTO: 3.64 K/UL (ref 3.9–12.7)

## 2020-07-02 PROCEDURE — 99214 PR OFFICE/OUTPT VISIT, EST, LEVL IV, 30-39 MIN: ICD-10-PCS | Mod: S$GLB,,, | Performed by: NURSE PRACTITIONER

## 2020-07-02 PROCEDURE — 99214 OFFICE O/P EST MOD 30 MIN: CPT | Mod: S$GLB,,, | Performed by: NURSE PRACTITIONER

## 2020-07-02 PROCEDURE — 82746 ASSAY OF FOLIC ACID SERUM: CPT

## 2020-07-02 PROCEDURE — 82728 ASSAY OF FERRITIN: CPT

## 2020-07-02 PROCEDURE — 83540 ASSAY OF IRON: CPT

## 2020-07-02 PROCEDURE — 80053 COMPREHEN METABOLIC PANEL: CPT

## 2020-07-02 PROCEDURE — 84443 ASSAY THYROID STIM HORMONE: CPT

## 2020-07-02 PROCEDURE — 85025 COMPLETE CBC W/AUTO DIFF WBC: CPT

## 2020-07-02 RX ORDER — FLUTICASONE PROPIONATE 50 MCG
1 SPRAY, SUSPENSION (ML) NASAL DAILY
Qty: 16 G | Refills: 3 | Status: SHIPPED | OUTPATIENT
Start: 2020-07-02

## 2020-07-02 NOTE — PROGRESS NOTES
Venipuncture performed with 21 gauge butterfly, x's 1 attempt,  to R Antecubital vein.  Specimens collected per orders.      Pressure dressing applied to site, instructed patient to remove dressing in 10-15 minutes, OK to re-adjust dressing if pressure causing any discomfort, to observe closely for numbness and/or discoloration to hand or fingers, and to notify provider if bleeding persists after applying constant pressure lasting 30 minutes.

## 2020-07-02 NOTE — PROGRESS NOTES
Subjective:       Patient ID: Iván Chavez is a 64 y.o. male.    Chief Complaint: Decreased energy (pt had dental implants 3.5 wks ago and has been feeling this way since) and Mole (pt would like mole checked)    HPI had dental implants done 3.5 weeks ago. Since then having more SOB, fatigue. Had anesthesia for the procedure. He feels this might be the cause of his SOB. States having some PND. He is doing well on his current medications. Having more allergy type symptoms. He denies any chest pain. States he cannot tolerate activities and the heat like he use too. He is eating and drinking well. He has a mole on his chest that he would like checked. see ROS.     He feels his anxiety is good with the celexa.     The following portion of the patients history was reviewed and updated as appropriate: allergies, current medications, past medical and surgical history. Past social history and problem list reviewed. Family PMH and Past social history reviewed. Tobacco, Illicit drug use reviewed.      Review of patient's allergies indicates:   Allergen Reactions    No known drug allergies        Current Outpatient Medications:     citalopram (CELEXA) 40 MG tablet, Take 1 tablet (40 mg total) by mouth once daily., Disp: 90 tablet, Rfl: 3    lisinopril 10 MG tablet, Take 1 tablet (10 mg total) by mouth once daily., Disp: 90 tablet, Rfl: 3    Past Medical History:   Diagnosis Date    Anxiety     Depression     History of hypertension     MVP (mitral valve prolapse)     S/P colonoscopy     12/12; 12/22       Past Surgical History:   Procedure Laterality Date    CHOLECYSTECTOMY         Social History     Socioeconomic History    Marital status:      Spouse name: Not on file    Number of children: Not on file    Years of education: Not on file    Highest education level: Not on file   Occupational History    Not on file   Social Needs    Financial resource strain: Not on file    Food insecurity     Worry:  Not on file     Inability: Not on file    Transportation needs     Medical: Not on file     Non-medical: Not on file   Tobacco Use    Smoking status: Never Smoker    Smokeless tobacco: Never Used   Substance and Sexual Activity    Alcohol use: No     Alcohol/week: 0.0 standard drinks    Drug use: No    Sexual activity: Yes     Partners: Female   Lifestyle    Physical activity     Days per week: Not on file     Minutes per session: Not on file    Stress: Not on file   Relationships    Social connections     Talks on phone: Not on file     Gets together: Not on file     Attends Hoahaoism service: Not on file     Active member of club or organization: Not on file     Attends meetings of clubs or organizations: Not on file     Relationship status: Not on file   Other Topics Concern    Not on file   Social History Narrative    Not on file     Review of Systems   Constitutional: Positive for fatigue. Negative for fever and unexpected weight change.   HENT: Positive for postnasal drip and rhinorrhea. Negative for congestion, dental problem, sinus pressure, sinus pain, sneezing, sore throat, trouble swallowing and voice change.    Eyes: Negative for photophobia, pain, itching and visual disturbance.   Respiratory: Positive for cough (dry) and shortness of breath. Negative for chest tightness and wheezing.         Exercises daily and finding it more hard to exercise. Gets SOB, weak feeling. He rides bike.    Cardiovascular: Negative for chest pain, palpitations and leg swelling.        Feels his heart rate has been higher than usual with exercise.    Gastrointestinal: Negative for abdominal pain, constipation, diarrhea, nausea and vomiting.   Genitourinary: Negative for difficulty urinating and frequency.   Musculoskeletal: Positive for myalgias (more muscle pain than usual). Negative for arthralgias, back pain and gait problem.   Skin: Negative for rash and wound.        Mole to right chest just below breast  "area. Cluster of moles that is irregular. Center mole is crusty with multicoloration. Needs to see Dermatology for evaluation   Allergic/Immunologic: Negative for environmental allergies, food allergies and immunocompromised state.   Neurological: Positive for weakness. Negative for tremors, speech difficulty, light-headedness and headaches.   Hematological: Negative for adenopathy. Does not bruise/bleed easily.   Psychiatric/Behavioral: Negative for behavioral problems, decreased concentration, self-injury, sleep disturbance and suicidal ideas.       Objective:      BP (!) 138/92 (BP Location: Right arm, Patient Position: Sitting)   Pulse 76   Temp 97.4 °F (36.3 °C) (Temporal)   Resp 16   Ht 5' 7" (1.702 m)   Wt 63.3 kg (139 lb 8.8 oz)   SpO2 98%   BMI 21.86 kg/m²      Physical Exam  Vitals signs and nursing note reviewed.   Constitutional:       General: He is not in acute distress.     Appearance: Normal appearance. He is well-developed. He is not diaphoretic.   HENT:      Head: Normocephalic and atraumatic.      Right Ear: Ear canal normal. Tympanic membrane is not injected.      Left Ear: Tympanic membrane and ear canal normal. Tympanic membrane is not injected.      Nose: No congestion or rhinorrhea.      Right Sinus: No maxillary sinus tenderness.      Left Sinus: No maxillary sinus tenderness.      Mouth/Throat:      Mouth: Mucous membranes are moist.      Pharynx: Oropharynx is clear. No oropharyngeal exudate.   Eyes:      General:         Right eye: No discharge.         Left eye: No discharge.      Conjunctiva/sclera: Conjunctivae normal.      Pupils: Pupils are equal, round, and reactive to light.   Neck:      Musculoskeletal: Normal range of motion and neck supple.      Thyroid: No thyromegaly.      Vascular: No JVD.   Cardiovascular:      Rate and Rhythm: Normal rate and regular rhythm.      Pulses: Normal pulses.           Radial pulses are 2+ on the right side and 2+ on the left side.      " Heart sounds: Normal heart sounds. No murmur. No gallop.    Pulmonary:      Effort: Pulmonary effort is normal. No respiratory distress.      Breath sounds: Normal breath sounds. No decreased breath sounds, wheezing or rales.   Chest:      Chest wall: No tenderness.       Abdominal:      General: Bowel sounds are normal. There is no distension.      Palpations: Abdomen is soft.      Tenderness: There is no abdominal tenderness. There is no guarding or rebound.   Musculoskeletal: Normal range of motion.      Right lower leg: No edema.      Left lower leg: No edema.      Comments: Gait and coordination normal.  strong, equal. Upper and lower extremity strength normal.    Lymphadenopathy:      Cervical: No cervical adenopathy.   Skin:     General: Skin is warm and dry.      Capillary Refill: Capillary refill takes less than 2 seconds.      Findings: No rash.      Comments: Mole to right chest just below the breast area. Cluster of 3 moles. Larger one in the middle with discoloration.    Neurological:      Mental Status: He is alert and oriented to person, place, and time.      Motor: Motor function is intact.   Psychiatric:         Attention and Perception: Attention normal.         Mood and Affect: Mood normal.         Speech: Speech normal.         Behavior: Behavior normal.         Thought Content: Thought content normal.         Assessment:       1. SOB (shortness of breath) on exertion    2. Allergic rhinitis, unspecified seasonality, unspecified trigger    3. Fatigue, unspecified type    4. Change in mole        Plan:       SOB (shortness of breath) on exertion: will get labs and stress test.  -     Stress Echo Which stress agent will be used? Treadmill Exercise; Color Flow Doppler? No; Future  -     CBC auto differential  -     Comprehensive metabolic panel  -     Ferritin  -     Iron and TIBC  -     TSH  -     Folate    Allergic rhinitis, unspecified seasonality, unspecified trigger: use flonase and  claritin daily.     Fatigue, unspecified type: hydrate well. Eat balanced diet.     Change in mole: mole looks benign but he has noticed some changes. he will call and find a dermatologist. He is cash pay.     Other orders  -     fluticasone propionate (FLONASE) 50 mcg/actuation nasal spray; 1 spray (50 mcg total) by Each Nostril route once daily.  Dispense: 16 g; Refill: 3       Continue current medication  Take medications only as prescribed  Healthy diet, exercise  Adequate rest  Adequate hydration  Avoid allergens  Avoid excessive caffeine     follow up after testing, sooner if issue arise.

## 2020-07-02 NOTE — TELEPHONE ENCOUNTER
Left pt another message to please call me back at [phone] to give me more details of his s/s prior to his appt this am.

## 2020-07-03 LAB — FOLATE SERPL-MCNC: 8.5 NG/ML (ref 4–24)

## 2020-07-13 ENCOUNTER — TELEPHONE (OUTPATIENT)
Dept: FAMILY MEDICINE | Facility: CLINIC | Age: 65
End: 2020-07-13

## 2020-07-13 NOTE — TELEPHONE ENCOUNTER
----- Message from Kelsie Fish sent at 7/13/2020  3:47 PM CDT -----  Contact: self  Type: Needs Medical Advice  Who Called:  patient   Best Call Back Number: [phone]  Additional Information: patient missed his 3pm appointment for a stress echo, please contact to reschedule,

## 2020-07-23 ENCOUNTER — CLINICAL SUPPORT (OUTPATIENT)
Dept: CARDIOLOGY | Facility: CLINIC | Age: 65
End: 2020-07-23
Attending: NURSE PRACTITIONER

## 2020-07-23 ENCOUNTER — PATIENT MESSAGE (OUTPATIENT)
Dept: FAMILY MEDICINE | Facility: CLINIC | Age: 65
End: 2020-07-23

## 2020-07-23 VITALS — BODY MASS INDEX: 21.82 KG/M2 | WEIGHT: 139 LBS | HEIGHT: 67 IN

## 2020-07-23 DIAGNOSIS — R06.02 SOB (SHORTNESS OF BREATH) ON EXERTION: ICD-10-CM

## 2020-07-23 LAB
ASCENDING AORTA: 3.63 CM
BSA FOR ECHO PROCEDURE: 1.73 M2
CV ECHO LV RWT: 0.37 CM
CV STRESS BASE HR: 85 BPM
DIASTOLIC BLOOD PRESSURE: 96 MMHG
DOP CALC LVOT AREA: 3.7 CM2
DOP CALC LVOT DIAMETER: 2.18 CM
DOP CALC LVOT PEAK VEL: 1.13 M/S
DOP CALC LVOT STROKE VOLUME: 75.13 CM3
DOP CALCLVOT PEAK VEL VTI: 20.14 CM
ECHO LV POSTERIOR WALL: 0.86 CM (ref 0.6–1.1)
FRACTIONAL SHORTENING: 30 % (ref 28–44)
INTERVENTRICULAR SEPTUM: 1.01 CM (ref 0.6–1.1)
IVRT: 108.47 MSEC
LA MAJOR: 3.87 CM
LA MINOR: 4.36 CM
LA WIDTH: 3.17 CM
LEFT ATRIUM SIZE: 2.89 CM
LEFT ATRIUM VOLUME INDEX: 18.4 ML/M2
LEFT ATRIUM VOLUME: 31.93 CM3
LEFT INTERNAL DIMENSION IN SYSTOLE: 3.24 CM (ref 2.1–4)
LEFT VENTRICLE DIASTOLIC VOLUME INDEX: 56.51 ML/M2
LEFT VENTRICLE DIASTOLIC VOLUME: 97.9 ML
LEFT VENTRICLE MASS INDEX: 84 G/M2
LEFT VENTRICLE SYSTOLIC VOLUME INDEX: 24.4 ML/M2
LEFT VENTRICLE SYSTOLIC VOLUME: 42.25 ML
LEFT VENTRICULAR INTERNAL DIMENSION IN DIASTOLE: 4.61 CM (ref 3.5–6)
LEFT VENTRICULAR MASS: 145.47 G
OHS CV CPX 1 MINUTE RECOVERY HEART RATE: 139 BPM
OHS CV CPX 85 PERCENT MAX PREDICTED HEART RATE MALE: 133
OHS CV CPX ESTIMATED METS: 16
OHS CV CPX MAX PREDICTED HEART RATE: 157
OHS CV CPX PATIENT IS FEMALE: 0
OHS CV CPX PATIENT IS MALE: 1
OHS CV CPX PEAK DIASTOLIC BLOOD PRESSURE: 87 MMHG
OHS CV CPX PEAK HEAR RATE: 157 BPM
OHS CV CPX PEAK RATE PRESSURE PRODUCT: NORMAL
OHS CV CPX PEAK SYSTOLIC BLOOD PRESSURE: 214 MMHG
OHS CV CPX PERCENT MAX PREDICTED HEART RATE ACHIEVED: 100
OHS CV CPX RATE PRESSURE PRODUCT PRESENTING: NORMAL
PISA TR MAX VEL: 2.21 M/S
PULM VEIN S/D RATIO: 1.77
PV PEAK D VEL: 0.39 M/S
PV PEAK S VEL: 0.69 M/S
RA MAJOR: 3.84 CM
RA PRESSURE: 3 MMHG
RA WIDTH: 4.35 CM
SINUS: 2.95 CM
STJ: 2.36 CM
STRESS ECHO POST EXERCISE DUR MIN: 9 MINUTES
STRESS ECHO POST EXERCISE DUR SEC: 14 SECONDS
SYSTOLIC BLOOD PRESSURE: 148 MMHG
TDI LATERAL: 0.11 M/S
TDI SEPTAL: 0.07 M/S
TDI: 0.09 M/S
TR MAX PG: 20 MMHG
TV REST PULMONARY ARTERY PRESSURE: 23 MMHG

## 2020-07-23 PROCEDURE — 99999 PR PBB SHADOW E&M-EST. PATIENT-LVL I: ICD-10-PCS | Mod: PBBFAC,,,

## 2020-07-23 PROCEDURE — 99999 PR PBB SHADOW E&M-EST. PATIENT-LVL I: CPT | Mod: PBBFAC,,,

## 2020-07-23 PROCEDURE — 99211 OFF/OP EST MAY X REQ PHY/QHP: CPT | Mod: PBBFAC,PO

## 2020-07-23 PROCEDURE — 93351 STRESS ECHO (CUPID ONLY): ICD-10-PCS | Mod: 26,S$PBB,, | Performed by: INTERNAL MEDICINE

## 2020-07-23 PROCEDURE — 93351 STRESS TTE COMPLETE: CPT | Mod: PBBFAC,PO | Performed by: INTERNAL MEDICINE

## 2020-07-30 ENCOUNTER — NURSE TRIAGE (OUTPATIENT)
Dept: ADMINISTRATIVE | Facility: CLINIC | Age: 65
End: 2020-07-30

## 2020-07-30 NOTE — TELEPHONE ENCOUNTER
Pt was exposed to COVID 19 positive person on Tuesday. Has chronic cough since October no change with it. No other s/s. protocol advice given and  pt verbalizes understanding.       Reason for Disposition   [1] COVID-19 EXPOSURE (Close Contact) AND [2] within last 14 days BUT [2] NO symptoms    Protocols used: CORONAVIRUS (COVID-19) EXPOSURE-A-[place]

## 2020-07-31 ENCOUNTER — LAB VISIT (OUTPATIENT)
Dept: PRIMARY CARE CLINIC | Facility: OTHER | Age: 65
End: 2020-07-31
Attending: INTERNAL MEDICINE
Payer: OTHER GOVERNMENT

## 2020-07-31 DIAGNOSIS — Z03.818 ENCOUNTER FOR OBSERVATION FOR SUSPECTED EXPOSURE TO OTHER BIOLOGICAL AGENTS RULED OUT: ICD-10-CM

## 2020-07-31 PROCEDURE — U0003 INFECTIOUS AGENT DETECTION BY NUCLEIC ACID (DNA OR RNA); SEVERE ACUTE RESPIRATORY SYNDROME CORONAVIRUS 2 (SARS-COV-2) (CORONAVIRUS DISEASE [COVID-19]), AMPLIFIED PROBE TECHNIQUE, MAKING USE OF HIGH THROUGHPUT TECHNOLOGIES AS DESCRIBED BY CMS-2020-01-R: HCPCS

## 2020-08-04 LAB — SARS-COV-2 RNA RESP QL NAA+PROBE: NORMAL

## 2021-05-06 ENCOUNTER — PATIENT MESSAGE (OUTPATIENT)
Dept: RESEARCH | Facility: HOSPITAL | Age: 66
End: 2021-05-06

## 2021-07-07 ENCOUNTER — PATIENT MESSAGE (OUTPATIENT)
Dept: ADMINISTRATIVE | Facility: HOSPITAL | Age: 66
End: 2021-07-07

## 2021-12-10 NOTE — LETTER
Date of Service:  12/10/2021    Chief Complaint:  Benign prostatic hypertrophy with obstruction  Slow stream, incomplete bladder emptying, nocturia  Elevated PSA    History of Present Illness:  The patient presents today a week since I last saw him, his medical records were reviewed and in summary, Jeet Guaman is a 61-year old male with a history of benign prostatic hypertrophy with obstruction.  His symptoms included a slow stream, incomplete bladder emptying and nocturia.  A CT scan done on 7/20/2021 revealed a 1 cm right non obstructing stone, enlarged prostate and a distended bladder.  He was started on the maximum medical management with Flomax 0.8 mg and Finasteride 5 mg.  I was also concerned as his PSA was elevated at 4.58 from 11/15/2021.  I recommended a transrectal ultrasound guided biopsy of his prostate.        Patient underwent a transrectal ultrasound guided biopsy of his prostate on 12/3/2021 which is thankfully negative for malignancy.   His calculated prostate volume on ultrasound was 042 mL.  From a voiding standpoint, he remains on the maximum medical management with Flomax 0.8 mg and Finasteride 5 mg.  He continues to get up frequently at night to urinate, his nocturia is x 3.            Independent Historian:  No     PSA Test Results:  Lab Results   Component Value Date    PSA 4.58 (H) 11/15/2021    PSA 5.25 (H) 08/16/2021    PSA 3.24 06/01/2020    PSA 2.95 12/11/2017    PSA 2.14 11/22/2016    PSA 2.21 11/11/2015    PSA 2.33 08/25/2014    PSA 2.10 08/20/2013       Past Medical History:   Past Medical History:   Diagnosis Date   • Arthritis    • Diabetes mellitus (CMS/HCC)    • Esophageal reflux     BARRETTS   • Essential (primary) hypertension    • Hepatitis C     dormant   • Nausea    • Other chronic pain     Lumbar Back Pain (Degenerative Disc Disease) and Bilateral Shoulders   • Peptic ulcer    • Prostate enlargement        Surgical History:  Past Surgical History:   Procedure Laterality  February 20, 2017    [place] Chavez  [address] [facility] [place] [address]             [facility]  [address]  [facility] [address]  Phone: [phone] Dear Mr. Chavez:    We have tried to reach you by mychart unsuccessfully.    [facility] is committed to your overall health.  To help you get the most out of each of your visits, we will review your information to make sure you are up to date on all of your recommended tests and/or procedures.  We have Dr. Rosa Medina listed as your primary care provider.     If Dr. Medina is no longer your primary care provider, please contact me so that we may update our records accordingly.       When you were last in our office, your blood pressure was 146/88, which is high.  Dr. Medina would like you to come in for a nurse visit so that we can check your blood pressure.  We are having a blood pressure fair the week of [place].  Please contact us so that we can schedule this for you.     She has found that you may also be due for your fasting cholesterol labs and shingles and flu immunizations.     If you have had any of the above done at an outside facility, please let us know so I can update your record.  If you have a copy of these records, please provide a copy for us to scan into your chart.  If not, please provide that provider/facilities contact information so that we may obtain copies from that facility.     Otherwise, please schedule these appointments at your earliest convenience.  You are due for your annual follow up with Dr. Medina before May of 2017.     If you have any questions or concerns, please don't hesitate to call.    Thank you for letting us care for you,  Shima Oliva LPN Clinical Care Coordinator  [facility] [place]  [phone]        Date   • Cholecystectomy     • Colonoscopy  14    NOT SURE ON EXACT DATE DONE IN DEC   • Hernia repair      Bilateral Inguinal   • Repair ing hernia,6+y/o,reducibl  2003   • Service to gastroenterology      EGD/COLONOSCOPY--DEC 2012   • Varicose vein surgery      bilateral   • Vascular surgery      Lower Left Leg Artery Repair   :    Family History:  Family history of G.U. (Genitourinary) Malignancy - none  Family History   Problem Relation Age of Onset   • Hypertension Mother    • Heart disease Father    • Diabetes Sister    • Hypertension Sister    • Heart disease Sister    • Heart disease Brother        Social History:  Social History     Socioeconomic History   • Marital status: /Civil Union     Spouse name: Not on file   • Number of children: 3   • Years of education: Not on file   • Highest education level: Not on file   Occupational History   • Not on file   Tobacco Use   • Smoking status: Former Smoker     Years: 23.00     Quit date: 2003     Years since quittin.9   • Smokeless tobacco: Never Used   Substance and Sexual Activity   • Alcohol use: No   • Drug use: No   • Sexual activity: Not on file   Other Topics Concern   • Not on file   Social History Narrative    Patient lives with wife     Social Determinants of Health     Financial Resource Strain:    • Social Determinants: Financial Resource Strain: Not on file   Food Insecurity:    • Social Determinants: Food Insecurity: Not on file   Transportation Needs:    • Lack of Transportation (Medical): Not on file   • Lack of Transportation (Non-Medical): Not on file   Physical Activity:    • Days of Exercise per Week: Not on file   • Minutes of Exercise per Session: Not on file   Stress:    • Social Determinants: Stress: Not on file   Social Connections:    • Social Determinants: Social Connections: Not on file   Intimate Partner Violence: Not At Risk   • Social Determinants: Intimate Partner Violence Past Fear: No   • Social  Determinants: Intimate Partner Violence Current Fear: No       Allergies:  ALLERGIES:   Allergen Reactions   • Ibuprofen      HX PEPTIC ULCERS       Medications:  Current Outpatient Medications   Medication Sig Dispense Refill   • oxyCODONE-acetaminophen (PERCOCET) 5-325 MG per tablet Take 1 tablet by mouth 2 times daily as needed for pain. 60 tablet 0   • finasteride (Proscar) 5 MG tablet Take 1 tablet by mouth daily. 90 tablet 0   • omeprazole (PrilOSEC) 20 MG capsule Take 1 capsule by mouth daily. 90 capsule 0   • ondansetron (Zofran ODT) 8 MG disintegrating tablet Place 1 tablet onto the tongue every 8 hours as needed for Nausea. 20 tablet 0   • Valacyclovir HCl 1000 MG Tab Take 1 tablet by mouth 3 times daily as needed (herpes). 21 tablet 3   • tiZANidine (ZANAFLEX) 4 MG tablet Take one tablet by mouth three times daily as needed for spasms 270 tablet 1   • pravastatin (PRAVACHOL) 20 MG tablet Take 1 tablet by mouth daily. 90 tablet 3   • metFORMIN (GLUCOPHAGE-XR) 500 MG 24 hr tablet TAKE 1 TABLET BY MOUTH  DAILY WITH BREAKFAST 90 tablet 3   • voriconazole (VFEND) 200 MG tablet TAKE 1 TABLET BY MOUTH  TWICE DAILY 180 tablet 0   • amLODIPine-Olmesartan 5-40 MG Tab TAKE 1 TABLET BY MOUTH  DAILY 90 tablet 3   • tamsulosin (FLOMAX) 0.4 MG Cap TAKE 2 CAPSULES BY MOUTH  DAILY AFTER A MEAL 180 capsule 3   • OneTouch Delica Lancets 33G Misc TEST THREE TIMES DAILY* IN MORNING, BEFORE DINNER, AND BEFORE BEDTIME 200 each 3   • sildenafil (REVATIO) 20 MG tablet Take 2-5 tablets by mouth as needed (erectile dysfunction). Take 2-5 tablets as needed for sexual activity 50 tablet 0   • ONE TOUCH ULTRA TEST test strip TEST THREE TIMES DAILY AS DIRECTED 300 strip 3   • nitroGLYcerin (NITROSTAT) 0.4 MG sublingual tablet PLACE 1 TABLET UNDER THE TONGUE EVERY 5 MINUTES AS NEEDED FOR  tablet 0   • Blood Glucose Monitoring Suppl (ONE TOUCH ULTRA 2) W/DEVICE Kit FOLLOW PACKAGE DIRECTIONS 1 kit 0     No current  facility-administered medications for this visit.       Allergies, Medications, Past Medical History, Past Surgical History, Family History, and Social History were reviewed today.    Physical Exam:    Constitutional:  Visit Vitals  /68   Pulse 99   Resp 20   Ht 5' 11\" (1.803 m)   Wt 89.4 kg (197 lb)   BMI 27.48 kg/m²     Well developed, well nourished, and afebrile.    Genitourinary: Not performed      Tests Reviewed: Surgical pathology  Tests Ordered: PSA, PVR and Uroflow    Assessment and Plan:  Therefore, we discussed where to go from here.      For his benign prostatic hypertrophy with obstruction with obstructive voiding symptoms -  We are going to continue him on the maximum medical management with Flomax 0.8 mg and Finasteride 5 mg.  If worsening symptoms, the next step would be to proceed with surgical intervention, however both he and I would like to hold off as long as possible.        For his slow stream, incomplete bladder emptying and nocturia-  I am recommending that he limit his evening fluid intake with hopes that this will help with his nocturia.   We will continue to monitor him with urinary flow studies in the office.      For his elevated prostate specific antigen-  Both he and I are pleased with his negative prostate biopsy today.  We will continue to monitor his values, his at some point his value continues to rise, I will likely recommended an MRI of his prostate.  We will discuss this in greater detail at his next visit.  He will need a prostate specific antigen prior to his next visit.        I would like to see him back in 3 months with a prostate specific antigen prior as well as a flow and residual.  He agrees with the plan and is pleased.          On 12/10/2021, Carmela ATWOOD scribed the services personally performed by Jensen Orozco MD  The documentation recorded by the scribe accurately and completely reflects the service(s) I personally performed and the decisions made by me.

## 2022-05-31 ENCOUNTER — PATIENT MESSAGE (OUTPATIENT)
Dept: ADMINISTRATIVE | Facility: HOSPITAL | Age: 67
End: 2022-05-31